# Patient Record
Sex: FEMALE | Race: WHITE | NOT HISPANIC OR LATINO | Employment: UNEMPLOYED | URBAN - METROPOLITAN AREA
[De-identification: names, ages, dates, MRNs, and addresses within clinical notes are randomized per-mention and may not be internally consistent; named-entity substitution may affect disease eponyms.]

---
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
## 2025-01-03 ENCOUNTER — APPOINTMENT (OUTPATIENT)
Dept: CARDIOLOGY | Facility: CLINIC | Age: 20
End: 2025-01-03
Payer: COMMERCIAL

## 2025-01-10 ENCOUNTER — APPOINTMENT (OUTPATIENT)
Dept: OBSTETRICS AND GYNECOLOGY | Facility: CLINIC | Age: 20
End: 2025-01-10
Payer: COMMERCIAL

## 2025-01-21 ENCOUNTER — APPOINTMENT (OUTPATIENT)
Dept: PRIMARY CARE | Facility: CLINIC | Age: 20
End: 2025-01-21
Payer: COMMERCIAL

## 2025-01-21 VITALS
WEIGHT: 112 LBS | HEART RATE: 73 BPM | HEIGHT: 64 IN | RESPIRATION RATE: 16 BRPM | DIASTOLIC BLOOD PRESSURE: 80 MMHG | SYSTOLIC BLOOD PRESSURE: 110 MMHG | OXYGEN SATURATION: 98 % | BODY MASS INDEX: 19.12 KG/M2

## 2025-01-21 DIAGNOSIS — Z11.4 ENCOUNTER FOR SCREENING FOR HIV: ICD-10-CM

## 2025-01-21 DIAGNOSIS — Z11.59 NEED FOR HEPATITIS C SCREENING TEST: ICD-10-CM

## 2025-01-21 DIAGNOSIS — F41.9 ANXIETY: ICD-10-CM

## 2025-01-21 DIAGNOSIS — Z72.0 VAPES NICOTINE CONTAINING SUBSTANCE: ICD-10-CM

## 2025-01-21 DIAGNOSIS — Z11.3 SCREENING FOR GONORRHEA: ICD-10-CM

## 2025-01-21 DIAGNOSIS — F32.2 SEVERE MAJOR DEPRESSION (MULTI): Primary | ICD-10-CM

## 2025-01-21 DIAGNOSIS — Z13.220 LIPID SCREENING: ICD-10-CM

## 2025-01-21 DIAGNOSIS — Z11.8 SCREENING FOR CHLAMYDIAL DISEASE: ICD-10-CM

## 2025-01-21 DIAGNOSIS — Z23 ENCOUNTER FOR IMMUNIZATION: ICD-10-CM

## 2025-01-21 DIAGNOSIS — A60.00 RECURRENT GENITAL HERPES: ICD-10-CM

## 2025-01-21 DIAGNOSIS — G43.109 MIGRAINE WITH AURA AND WITHOUT STATUS MIGRAINOSUS, NOT INTRACTABLE: ICD-10-CM

## 2025-01-21 PROBLEM — R00.2 PALPITATIONS: Status: RESOLVED | Noted: 2024-10-03 | Resolved: 2025-01-21

## 2025-01-21 PROBLEM — R55 SYNCOPAL EPISODES: Status: RESOLVED | Noted: 2024-10-03 | Resolved: 2025-01-21

## 2025-01-21 PROBLEM — R06.02 SOBOE (SHORTNESS OF BREATH ON EXERTION): Status: RESOLVED | Noted: 2024-12-26 | Resolved: 2025-01-21

## 2025-01-21 PROBLEM — R42 DIZZINESS: Status: RESOLVED | Noted: 2024-10-03 | Resolved: 2025-01-21

## 2025-01-21 PROCEDURE — 90471 IMMUNIZATION ADMIN: CPT | Performed by: FAMILY MEDICINE

## 2025-01-21 PROCEDURE — 3008F BODY MASS INDEX DOCD: CPT | Performed by: FAMILY MEDICINE

## 2025-01-21 PROCEDURE — 90677 PCV20 VACCINE IM: CPT | Performed by: FAMILY MEDICINE

## 2025-01-21 PROCEDURE — 1036F TOBACCO NON-USER: CPT | Performed by: FAMILY MEDICINE

## 2025-01-21 PROCEDURE — 99214 OFFICE O/P EST MOD 30 MIN: CPT | Performed by: FAMILY MEDICINE

## 2025-01-21 RX ORDER — TOPIRAMATE 25 MG/1
TABLET ORAL
Qty: 70 TABLET | Refills: 0 | Status: SHIPPED | OUTPATIENT
Start: 2025-01-21

## 2025-01-21 RX ORDER — FLUOXETINE HYDROCHLORIDE 20 MG/1
20 CAPSULE ORAL DAILY
Qty: 30 CAPSULE | Refills: 5 | Status: SHIPPED | OUTPATIENT
Start: 2025-01-21 | End: 2025-07-20

## 2025-01-21 RX ORDER — BACLOFEN 20 MG
TABLET ORAL
COMMUNITY

## 2025-01-21 RX ORDER — SUMATRIPTAN SUCCINATE 50 MG/1
50 TABLET ORAL ONCE AS NEEDED
Qty: 9 TABLET | Refills: 5 | Status: SHIPPED | OUTPATIENT
Start: 2025-01-21 | End: 2026-01-21

## 2025-01-21 ASSESSMENT — ANXIETY QUESTIONNAIRES
5. BEING SO RESTLESS THAT IT IS HARD TO SIT STILL: MORE THAN HALF THE DAYS
IF YOU CHECKED OFF ANY PROBLEMS ON THIS QUESTIONNAIRE, HOW DIFFICULT HAVE THESE PROBLEMS MADE IT FOR YOU TO DO YOUR WORK, TAKE CARE OF THINGS AT HOME, OR GET ALONG WITH OTHER PEOPLE: VERY DIFFICULT
GAD7 TOTAL SCORE: 14
3. WORRYING TOO MUCH ABOUT DIFFERENT THINGS: MORE THAN HALF THE DAYS
7. FEELING AFRAID AS IF SOMETHING AWFUL MIGHT HAPPEN: MORE THAN HALF THE DAYS
2. NOT BEING ABLE TO STOP OR CONTROL WORRYING: MORE THAN HALF THE DAYS
6. BECOMING EASILY ANNOYED OR IRRITABLE: MORE THAN HALF THE DAYS
1. FEELING NERVOUS, ANXIOUS, OR ON EDGE: MORE THAN HALF THE DAYS
4. TROUBLE RELAXING: MORE THAN HALF THE DAYS

## 2025-01-21 ASSESSMENT — PATIENT HEALTH QUESTIONNAIRE - PHQ9
5. POOR APPETITE OR OVEREATING: MORE THAN HALF THE DAYS
8. MOVING OR SPEAKING SO SLOWLY THAT OTHER PEOPLE COULD HAVE NOTICED. OR THE OPPOSITE, BEING SO FIGETY OR RESTLESS THAT YOU HAVE BEEN MOVING AROUND A LOT MORE THAN USUAL: MORE THAN HALF THE DAYS
10. IF YOU CHECKED OFF ANY PROBLEMS, HOW DIFFICULT HAVE THESE PROBLEMS MADE IT FOR YOU TO DO YOUR WORK, TAKE CARE OF THINGS AT HOME, OR GET ALONG WITH OTHER PEOPLE: VERY DIFFICULT
7. TROUBLE CONCENTRATING ON THINGS, SUCH AS READING THE NEWSPAPER OR WATCHING TELEVISION: SEVERAL DAYS
SUM OF ALL RESPONSES TO PHQ9 QUESTIONS 1 & 2: 4
3. TROUBLE FALLING OR STAYING ASLEEP: NEARLY EVERY DAY
SUM OF ALL RESPONSES TO PHQ QUESTIONS 1-9: 18
9. THOUGHTS THAT YOU WOULD BE BETTER OFF DEAD, OR OF HURTING YOURSELF: NOT AT ALL
6. FEELING BAD ABOUT YOURSELF - OR THAT YOU ARE A FAILURE OR HAVE LET YOURSELF OR YOUR FAMILY DOWN: NEARLY EVERY DAY
2. FEELING DOWN, DEPRESSED OR HOPELESS: MORE THAN HALF THE DAYS
1. LITTLE INTEREST OR PLEASURE IN DOING THINGS: MORE THAN HALF THE DAYS
4. FEELING TIRED OR HAVING LITTLE ENERGY: NEARLY EVERY DAY

## 2025-01-21 ASSESSMENT — ENCOUNTER SYMPTOMS
DIAPHORESIS: 0
NAUSEA: 0
FREQUENCY: 0
HEMATURIA: 0
PALPITATIONS: 0
COUGH: 0
DYSPHORIC MOOD: 1
CONSTIPATION: 0
ABDOMINAL PAIN: 0
WHEEZING: 0
VOMITING: 0
POLYPHAGIA: 0
POLYDIPSIA: 0
SINUS PRESSURE: 0
NERVOUS/ANXIOUS: 0
DYSURIA: 0
SINUS PAIN: 0
ADENOPATHY: 0
UNEXPECTED WEIGHT CHANGE: 0
SORE THROAT: 0
CONFUSION: 0
NUMBNESS: 0
CHEST TIGHTNESS: 0
CHILLS: 0
HEADACHES: 1
SHORTNESS OF BREATH: 0
DIARRHEA: 0
LIGHT-HEADEDNESS: 0
DIZZINESS: 0
FEVER: 0

## 2025-01-21 NOTE — PATIENT INSTRUCTIONS
Tracee Manley ,    Thank you for coming in today. We at [facility] appreciate your trust in our care. If you have any questions or concerns about the care you received today, please do not hesitate to contact us at [phone].    The following instructions were discussed today:    - INCREASE fluoxetine to 20 mg daily   - start topiramate --> Take 25 mg at bedtime x 1 week, then take 25 mg twice daily x 1 week, then take 25 mg in AM and 50 mg in PM x 1 week, then take 50 mg twice daily  - use imitrex as needed for migraines   - get labs  - For blood work: Nothing to eat or drink for at least 10 hours prior. Okay for water or black coffee.   - Follow up in 1 month to discuss depression and headaches   I recommend the following vaccines at the pharmacy: Flu, COVID-19

## 2025-01-21 NOTE — ASSESSMENT & PLAN NOTE
- still getting daily headaches and migraines about 2-3 times a week   - had side effects with propranolol (fatigue) and it was ineffective  - has not tried imitrex yet  - encouraged her to try imitrex as needed  - trial topiramate --> Take 25 mg at bedtime x 1 week, then take 25 mg twice daily x 1 week, then take 25 mg in AM and 50 mg in PM x 1 week, then take 50 mg twice daily

## 2025-01-21 NOTE — PROGRESS NOTES
"Subjective   Patient ID: Tracee Manley is a 19 y.o. female who presents for follow up (Would like Prevnar vaccine today (vapes)/Wants to be on birth control, would you prefer she ask her ob/gyn?).    HPI   routine follow up. chronic issues as per assessment and plan.     Tolerating fluoxetine without issues. She feels the dose is too low. Denies medication side effects.     She is no longer taking Inderal LA. Does not feel that it helped her headaches and it caused her to be very tired. Has not tried the imitrex.     Patient Health Questionnaire-9 Score: 18 (1/21/2025  9:00 AM)   LANIE-7 Total Score: 14 (1/21/2025  9:00 AM)     Review of Systems   Constitutional:  Negative for chills, diaphoresis, fever and unexpected weight change.   HENT:  Negative for congestion, sinus pressure, sinus pain, sneezing and sore throat.    Respiratory:  Negative for cough, chest tightness, shortness of breath and wheezing.    Cardiovascular:  Negative for chest pain, palpitations and leg swelling.   Gastrointestinal:  Negative for abdominal pain, constipation, diarrhea, nausea and vomiting.   Endocrine: Negative for cold intolerance, heat intolerance, polydipsia, polyphagia and polyuria.   Genitourinary:  Negative for dysuria, frequency, hematuria and urgency.   Neurological:  Positive for headaches. Negative for dizziness, syncope, light-headedness and numbness.   Hematological:  Negative for adenopathy.   Psychiatric/Behavioral:  Positive for dysphoric mood. Negative for confusion. The patient is not nervous/anxious.        Objective   /80 (BP Location: Right arm, Patient Position: Sitting, BP Cuff Size: Adult)   Pulse 73   Resp 16   Ht 1.626 m (5' 4\")   Wt 50.8 kg (112 lb)   SpO2 98%   BMI 19.22 kg/m²     Physical Exam  Vitals and nursing note reviewed.   Constitutional:       General: She is not in acute distress.     Appearance: Normal appearance.   HENT:      Head: Normocephalic and atraumatic.      Nose: Nose " normal.   Eyes:      Extraocular Movements: Extraocular movements intact.      Conjunctiva/sclera: Conjunctivae normal.      Pupils: Pupils are equal, round, and reactive to light.   Cardiovascular:      Rate and Rhythm: Normal rate and regular rhythm.      Heart sounds: No murmur heard.     No friction rub. No gallop.   Pulmonary:      Effort: Pulmonary effort is normal.      Breath sounds: Normal breath sounds. No wheezing, rhonchi or rales.   Abdominal:      General: Bowel sounds are normal. There is no distension.      Palpations: Abdomen is soft.      Tenderness: There is no abdominal tenderness.   Musculoskeletal:         General: Normal range of motion.      Cervical back: Normal range of motion and neck supple.   Skin:     General: Skin is warm and dry.   Neurological:      General: No focal deficit present.      Mental Status: She is alert and oriented to person, place, and time.      Deep Tendon Reflexes: Reflexes normal.   Psychiatric:         Mood and Affect: Mood normal.         Behavior: Behavior normal.         Thought Content: Thought content normal.         Judgment: Judgment normal.         Assessment/Plan   Problem List Items Addressed This Visit             ICD-10-CM    Anxiety F41.9     - suboptimally controlled.   -  INCREASE fluoxetine to 20 mg daily          Relevant Medications    FLUoxetine (PROzac) 20 mg capsule    Other Relevant Orders    CBC and Auto Differential    Comprehensive Metabolic Panel    TSH with reflex to Free T4 if abnormal    Vitamin B12    Encounter for immunization Z23     - PCV 20 administered today   - recommended the following vaccines at the pharmacy: Flu, COVID-19         Relevant Orders    Pneumococcal conjugate vaccine, 20-valent (PREVNAR 20) (Completed)    Migraine with aura and without status migrainosus, not intractable G43.109     - still getting daily headaches and migraines about 2-3 times a week   - had side effects with propranolol (fatigue) and it was  ineffective  - has not tried imitrex yet  - encouraged her to try imitrex as needed  - trial topiramate --> Take 25 mg at bedtime x 1 week, then take 25 mg twice daily x 1 week, then take 25 mg in AM and 50 mg in PM x 1 week, then take 50 mg twice daily         Relevant Medications    SUMAtriptan (Imitrex) 50 mg tablet    topiramate (Topamax) 25 mg tablet    Other Relevant Orders    Comprehensive Metabolic Panel    TSH with reflex to Free T4 if abnormal    Recurrent genital herpes A60.00     - continue valtrex          Screening for chlamydial disease Z11.8    Relevant Orders    C. trachomatis / N. gonorrhoeae, Amplified    Screening for gonorrhea Z11.3    Relevant Orders    C. trachomatis / N. gonorrhoeae, Amplified    Severe major depression (Multi) - Primary F32.2     - suboptimally controlled.   -  INCREASE fluoxetine to 20 mg daily          Relevant Medications    FLUoxetine (PROzac) 20 mg capsule    Other Relevant Orders    CBC and Auto Differential    Comprehensive Metabolic Panel    TSH with reflex to Free T4 if abnormal    Vitamin B12    Vapes nicotine containing substance Z72.0     - encouraged cessation          Other Visit Diagnoses         Codes    Lipid screening     Z13.220    Relevant Orders    Lipid Panel    Encounter for screening for HIV     Z11.4    Relevant Orders    HIV 1/2 Antigen/Antibody Screen with Reflex to Confirmation    Need for hepatitis C screening test     Z11.59    Relevant Orders    Hepatitis C Antibody

## 2025-02-04 ENCOUNTER — APPOINTMENT (OUTPATIENT)
Dept: PRIMARY CARE | Facility: CLINIC | Age: 20
End: 2025-02-04
Payer: COMMERCIAL

## 2025-02-12 ENCOUNTER — PATIENT MESSAGE (OUTPATIENT)
Dept: PRIMARY CARE | Facility: CLINIC | Age: 20
End: 2025-02-12
Payer: COMMERCIAL

## 2025-02-12 LAB
ALBUMIN SERPL-MCNC: 4.5 G/DL (ref 3.6–5.1)
ALP SERPL-CCNC: 88 U/L (ref 36–128)
ALT SERPL-CCNC: 10 U/L (ref 5–32)
ANION GAP SERPL CALCULATED.4IONS-SCNC: 7 MMOL/L (CALC) (ref 7–17)
AST SERPL-CCNC: 13 U/L (ref 12–32)
BASOPHILS # BLD AUTO: 28 CELLS/UL (ref 0–200)
BASOPHILS NFR BLD AUTO: 0.5 %
BILIRUB SERPL-MCNC: 0.5 MG/DL (ref 0.2–1.1)
BUN SERPL-MCNC: 7 MG/DL (ref 7–20)
C TRACH RRNA SPEC QL NAA+PROBE: NOT DETECTED
CALCIUM SERPL-MCNC: 9.3 MG/DL (ref 8.9–10.4)
CHLORIDE SERPL-SCNC: 106 MMOL/L (ref 98–110)
CHOLEST SERPL-MCNC: 137 MG/DL
CHOLEST/HDLC SERPL: 2 (CALC)
CO2 SERPL-SCNC: 24 MMOL/L (ref 20–32)
CREAT SERPL-MCNC: 0.53 MG/DL (ref 0.5–0.96)
EGFRCR SERPLBLD CKD-EPI 2021: 137 ML/MIN/1.73M2
EOSINOPHIL # BLD AUTO: 101 CELLS/UL (ref 15–500)
EOSINOPHIL NFR BLD AUTO: 1.8 %
ERYTHROCYTE [DISTWIDTH] IN BLOOD BY AUTOMATED COUNT: 13.1 % (ref 11–15)
GLUCOSE SERPL-MCNC: 77 MG/DL (ref 65–99)
HCT VFR BLD AUTO: 38.6 % (ref 35–45)
HCV AB SERPL QL IA: NORMAL
HDLC SERPL-MCNC: 69 MG/DL
HGB BLD-MCNC: 12.5 G/DL (ref 11.7–15.5)
HIV 1+2 AB+HIV1 P24 AG SERPL QL IA: NORMAL
LDLC SERPL CALC-MCNC: 57 MG/DL (CALC)
LYMPHOCYTES # BLD AUTO: 1137 CELLS/UL (ref 850–3900)
LYMPHOCYTES NFR BLD AUTO: 20.3 %
MCH RBC QN AUTO: 29.9 PG (ref 27–33)
MCHC RBC AUTO-ENTMCNC: 32.4 G/DL (ref 32–36)
MCV RBC AUTO: 92.3 FL (ref 80–100)
MONOCYTES # BLD AUTO: 403 CELLS/UL (ref 200–950)
MONOCYTES NFR BLD AUTO: 7.2 %
N GONORRHOEA RRNA SPEC QL NAA+PROBE: NOT DETECTED
NEUTROPHILS # BLD AUTO: 3931 CELLS/UL (ref 1500–7800)
NEUTROPHILS NFR BLD AUTO: 70.2 %
NONHDLC SERPL-MCNC: 68 MG/DL (CALC)
PLATELET # BLD AUTO: 165 THOUSAND/UL (ref 140–400)
PMV BLD REES-ECKER: 13.3 FL (ref 7.5–12.5)
POTASSIUM SERPL-SCNC: 3.8 MMOL/L (ref 3.8–5.1)
PROT SERPL-MCNC: 6.8 G/DL (ref 6.3–8.2)
QUEST GC CT AMPLIFIED (ALWAYS MESSAGE): NORMAL
RBC # BLD AUTO: 4.18 MILLION/UL (ref 3.8–5.1)
SODIUM SERPL-SCNC: 137 MMOL/L (ref 135–146)
TRIGL SERPL-MCNC: 37 MG/DL
TSH SERPL-ACNC: 2.68 MIU/L
VIT B12 SERPL-MCNC: 561 PG/ML (ref 200–1100)
WBC # BLD AUTO: 5.6 THOUSAND/UL (ref 3.8–10.8)

## 2025-02-21 ENCOUNTER — APPOINTMENT (OUTPATIENT)
Dept: PRIMARY CARE | Facility: CLINIC | Age: 20
End: 2025-02-21
Payer: COMMERCIAL

## 2025-02-21 ENCOUNTER — TELEPHONE (OUTPATIENT)
Dept: PRIMARY CARE | Facility: CLINIC | Age: 20
End: 2025-02-21

## 2025-02-21 DIAGNOSIS — Z34.90 PREGNANCY, UNSPECIFIED GESTATIONAL AGE (HHS-HCC): ICD-10-CM

## 2025-02-21 DIAGNOSIS — F41.9 ANXIETY: ICD-10-CM

## 2025-02-21 DIAGNOSIS — A60.00 RECURRENT GENITAL HERPES: ICD-10-CM

## 2025-02-21 DIAGNOSIS — F32.2 SEVERE MAJOR DEPRESSION (MULTI): Primary | ICD-10-CM

## 2025-02-21 DIAGNOSIS — G43.109 MIGRAINE WITH AURA AND WITHOUT STATUS MIGRAINOSUS, NOT INTRACTABLE: ICD-10-CM

## 2025-02-21 DIAGNOSIS — F17.210 CIGARETTE NICOTINE DEPENDENCE WITHOUT COMPLICATION: ICD-10-CM

## 2025-02-21 PROBLEM — N91.2 AMENORRHEA: Status: RESOLVED | Noted: 2024-10-03 | Resolved: 2025-02-21

## 2025-02-21 PROCEDURE — 1036F TOBACCO NON-USER: CPT | Performed by: FAMILY MEDICINE

## 2025-02-21 PROCEDURE — 99213 OFFICE O/P EST LOW 20 MIN: CPT | Performed by: FAMILY MEDICINE

## 2025-02-21 RX ORDER — NICOTINE 4 MG/1
1 INHALANT RESPIRATORY (INHALATION) AS NEEDED
Qty: 42 EACH | Refills: 0 | Status: SHIPPED | OUTPATIENT
Start: 2025-02-21 | End: 2025-03-23

## 2025-02-21 RX ORDER — SERTRALINE HYDROCHLORIDE 50 MG/1
50 TABLET, FILM COATED ORAL DAILY
Qty: 30 TABLET | Refills: 1 | Status: SHIPPED | OUTPATIENT
Start: 2025-02-21 | End: 2025-04-22

## 2025-02-21 ASSESSMENT — ENCOUNTER SYMPTOMS
CHEST TIGHTNESS: 0
UNEXPECTED WEIGHT CHANGE: 0
DIARRHEA: 0
WHEEZING: 0
CONFUSION: 0
SORE THROAT: 0
PALPITATIONS: 0
DIZZINESS: 0
NERVOUS/ANXIOUS: 0
CONSTIPATION: 0
COUGH: 0
DYSURIA: 0
FREQUENCY: 0
SHORTNESS OF BREATH: 0
LIGHT-HEADEDNESS: 0
NUMBNESS: 0
POLYDIPSIA: 0
VOMITING: 0
DIAPHORESIS: 0
NAUSEA: 0
HEADACHES: 0
CHILLS: 0
FEVER: 0
HEMATURIA: 0
ABDOMINAL PAIN: 0
ADENOPATHY: 0
DYSPHORIC MOOD: 0
POLYPHAGIA: 0
SINUS PRESSURE: 0
SINUS PAIN: 0

## 2025-02-21 NOTE — ASSESSMENT & PLAN NOTE
- no relief with topomax  - sumatriptan contraindicated in pregnancy  - discussed treatment options. She is going to work with her chiropractor to see if manipulation will help with the migraines

## 2025-02-21 NOTE — ASSESSMENT & PLAN NOTE
- she has been on fluoxetine but recently found out she is pregnant  - switch fluoxetine to sertraline

## 2025-02-21 NOTE — PATIENT INSTRUCTIONS
Tracee Manley ,    Thank you for coming in today. We at [facility] appreciate your trust in our care. If you have any questions or concerns about the care you received today, please do not hesitate to contact us at [phone].    The following instructions were discussed today:    - START sertraline 25 mg daily for one week and then INCREASE to 50 mg daily   - Follow up 7/21/2025 as scheduled.

## 2025-02-21 NOTE — PROGRESS NOTES
Subjective   Patient ID: Tracee Manley is a 19 y.o. female who presents for Follow-up.    Virtual or Telephone Consent    An interactive audio and video telecommunication system which permits real time communications between the patient (at the originating site) and provider (at the distant site) was utilized to provide this telehealth service.   Verbal consent was requested and obtained from Tracee Manley on this date, 02/21/25 for a telehealth visit.      HPI  Follow up depression/ anxiety. Increased fluoxetine at last visit.     Since her last visit, she has found out that she is pregnant. She does currently smoke and would like help with stopping.     Review of Systems   Constitutional:  Negative for chills, diaphoresis, fever and unexpected weight change.   HENT:  Negative for congestion, sinus pressure, sinus pain, sneezing and sore throat.    Respiratory:  Negative for cough, chest tightness, shortness of breath and wheezing.    Cardiovascular:  Negative for chest pain, palpitations and leg swelling.   Gastrointestinal:  Negative for abdominal pain, constipation, diarrhea, nausea and vomiting.   Endocrine: Negative for cold intolerance, heat intolerance, polydipsia, polyphagia and polyuria.   Genitourinary:  Negative for dysuria, frequency, hematuria and urgency.   Neurological:  Negative for dizziness, syncope, light-headedness, numbness and headaches.   Hematological:  Negative for adenopathy.   Psychiatric/Behavioral:  Negative for confusion and dysphoric mood. The patient is not nervous/anxious.          Assessment/Plan   Problem List Items Addressed This Visit       Anxiety     - she has been on fluoxetine but recently found out she is pregnant  - switch fluoxetine to sertraline          Relevant Medications    sertraline (Zoloft) 50 mg tablet    Cigarette nicotine dependence without complication     - discussed risk of smoking during pregnancy. She is trying to stop but having a difficult time.  Will trial nicotrol inhaler to see if that helps.          Relevant Medications    nicotine (Nicotrol) 10 mg inhaler    Migraine with aura and without status migrainosus, not intractable     - no relief with topomax  - sumatriptan contraindicated in pregnancy  - discussed treatment options. She is going to work with her chiropractor to see if manipulation will help with the migraines          Pregnancy ([facility])     - will be seeing OB/ GYN         Recurrent genital herpes     - continue valtrex          Severe major depression (Multi) - Primary     - she has been on fluoxetine but recently found out she is pregnant  - switch fluoxetine to sertraline          Relevant Medications    sertraline (Zoloft) 50 mg tablet

## 2025-02-21 NOTE — ASSESSMENT & PLAN NOTE
- discussed risk of smoking during pregnancy. She is trying to stop but having a difficult time. Will trial nicotrol inhaler to see if that helps.

## 2025-02-24 ENCOUNTER — APPOINTMENT (OUTPATIENT)
Dept: OBSTETRICS AND GYNECOLOGY | Facility: CLINIC | Age: 20
End: 2025-02-24
Payer: COMMERCIAL

## 2025-02-24 NOTE — TELEPHONE ENCOUNTER
Patient is scheduled for March 21st, left detailed message for patient on her voicemail. Patient advised this will be a virtual visit via [employer].

## 2025-02-25 ENCOUNTER — TELEPHONE (OUTPATIENT)
Dept: PRIMARY CARE | Facility: CLINIC | Age: 20
End: 2025-02-25
Payer: COMMERCIAL

## 2025-02-25 NOTE — TELEPHONE ENCOUNTER
please let patient know  nicotrol inhaler has been discontinued. Would she like to try the nicotine patch instead?

## 2025-03-21 ENCOUNTER — APPOINTMENT (OUTPATIENT)
Dept: PRIMARY CARE | Facility: CLINIC | Age: 20
End: 2025-03-21
Payer: COMMERCIAL

## 2025-04-30 ENCOUNTER — PATIENT MESSAGE (OUTPATIENT)
Dept: PRIMARY CARE | Facility: CLINIC | Age: 20
End: 2025-04-30
Payer: COMMERCIAL

## 2025-06-29 ENCOUNTER — PATIENT MESSAGE (OUTPATIENT)
Dept: PRIMARY CARE | Facility: CLINIC | Age: 20
End: 2025-06-29
Payer: COMMERCIAL

## 2025-06-29 DIAGNOSIS — Z72.0 VAPES NICOTINE CONTAINING SUBSTANCE: Primary | ICD-10-CM

## 2025-06-30 ENCOUNTER — TELEPHONE (OUTPATIENT)
Dept: PRIMARY CARE | Facility: CLINIC | Age: 20
End: 2025-06-30
Payer: COMMERCIAL

## 2025-06-30 DIAGNOSIS — F32.2 SEVERE MAJOR DEPRESSION (MULTI): ICD-10-CM

## 2025-06-30 DIAGNOSIS — F41.9 ANXIETY: ICD-10-CM

## 2025-06-30 RX ORDER — SERTRALINE HYDROCHLORIDE 50 MG/1
50 TABLET, FILM COATED ORAL DAILY
Qty: 30 TABLET | Refills: 1 | Status: SHIPPED | OUTPATIENT
Start: 2025-06-30 | End: 2025-08-29

## 2025-07-02 RX ORDER — IBUPROFEN 200 MG
1 TABLET ORAL EVERY 24 HOURS
Qty: 42 PATCH | Refills: 0 | Status: SHIPPED | OUTPATIENT
Start: 2025-07-02 | End: 2025-08-13

## 2025-07-02 RX ORDER — IBUPROFEN 200 MG
1 TABLET ORAL EVERY 24 HOURS
Qty: 14 PATCH | Refills: 0 | Status: SHIPPED | OUTPATIENT
Start: 2025-08-13 | End: 2025-08-27

## 2025-07-02 RX ORDER — NICOTINE 7MG/24HR
1 PATCH, TRANSDERMAL 24 HOURS TRANSDERMAL EVERY 24 HOURS
Qty: 14 PATCH | Refills: 0 | Status: SHIPPED | OUTPATIENT
Start: 2025-08-27 | End: 2025-09-10

## 2025-07-21 ENCOUNTER — APPOINTMENT (OUTPATIENT)
Dept: PRIMARY CARE | Facility: CLINIC | Age: 20
End: 2025-07-21
Payer: COMMERCIAL

## 2025-07-21 VITALS
SYSTOLIC BLOOD PRESSURE: 104 MMHG | RESPIRATION RATE: 16 BRPM | BODY MASS INDEX: 22.36 KG/M2 | WEIGHT: 131 LBS | HEART RATE: 73 BPM | HEIGHT: 64 IN | OXYGEN SATURATION: 98 % | DIASTOLIC BLOOD PRESSURE: 63 MMHG

## 2025-07-21 DIAGNOSIS — D22.5: ICD-10-CM

## 2025-07-21 DIAGNOSIS — Z72.0 VAPES NICOTINE CONTAINING SUBSTANCE: ICD-10-CM

## 2025-07-21 DIAGNOSIS — A60.00 RECURRENT GENITAL HERPES: ICD-10-CM

## 2025-07-21 DIAGNOSIS — F41.9 ANXIETY: ICD-10-CM

## 2025-07-21 DIAGNOSIS — Z13.220 LIPID SCREENING: ICD-10-CM

## 2025-07-21 DIAGNOSIS — Z34.90 PREGNANCY, UNSPECIFIED GESTATIONAL AGE (HHS-HCC): ICD-10-CM

## 2025-07-21 DIAGNOSIS — F32.2 SEVERE MAJOR DEPRESSION (MULTI): Primary | ICD-10-CM

## 2025-07-21 PROBLEM — F17.211 CIGARETTE NICOTINE DEPENDENCE IN REMISSION: Status: ACTIVE | Noted: 2025-02-21

## 2025-07-21 PROCEDURE — 3008F BODY MASS INDEX DOCD: CPT | Performed by: FAMILY MEDICINE

## 2025-07-21 PROCEDURE — 99213 OFFICE O/P EST LOW 20 MIN: CPT | Performed by: FAMILY MEDICINE

## 2025-07-21 RX ORDER — NICOTINE 7MG/24HR
1 PATCH, TRANSDERMAL 24 HOURS TRANSDERMAL EVERY 24 HOURS
Qty: 14 PATCH | Refills: 0 | Status: SHIPPED | OUTPATIENT
Start: 2025-08-27 | End: 2025-09-10

## 2025-07-21 RX ORDER — FERROUS SULFATE 325(65) MG
1 TABLET ORAL EVERY OTHER DAY
COMMUNITY

## 2025-07-21 RX ORDER — DOCUSATE SODIUM 100 MG/1
100 CAPSULE, LIQUID FILLED ORAL 2 TIMES DAILY
COMMUNITY
Start: 2025-04-01

## 2025-07-21 RX ORDER — VALACYCLOVIR HYDROCHLORIDE 500 MG/1
500 TABLET, FILM COATED ORAL DAILY
Qty: 90 TABLET | Refills: 1 | Status: SHIPPED | OUTPATIENT
Start: 2025-07-21

## 2025-07-21 RX ORDER — IBUPROFEN 200 MG
1 TABLET ORAL EVERY 24 HOURS
Qty: 14 PATCH | Refills: 0 | Status: SHIPPED | OUTPATIENT
Start: 2025-08-13 | End: 2025-08-27

## 2025-07-21 RX ORDER — IBUPROFEN 200 MG
1 TABLET ORAL EVERY 24 HOURS
Qty: 42 PATCH | Refills: 0 | Status: SHIPPED | OUTPATIENT
Start: 2025-07-21 | End: 2025-09-01

## 2025-07-21 RX ORDER — SERTRALINE HYDROCHLORIDE 50 MG/1
50 TABLET, FILM COATED ORAL DAILY
Qty: 90 TABLET | Refills: 1 | Status: SHIPPED | OUTPATIENT
Start: 2025-07-21 | End: 2026-01-17

## 2025-07-21 ASSESSMENT — ENCOUNTER SYMPTOMS
FREQUENCY: 0
HEMATURIA: 0
SHORTNESS OF BREATH: 0
ABDOMINAL PAIN: 0
DIZZINESS: 0
NAUSEA: 0
UNEXPECTED WEIGHT CHANGE: 0
POLYPHAGIA: 0
COUGH: 0
DYSPHORIC MOOD: 0
DYSURIA: 0
VOMITING: 0
FEVER: 0
CHEST TIGHTNESS: 0
CONSTIPATION: 0
PALPITATIONS: 0
NUMBNESS: 0
DIARRHEA: 0
HEADACHES: 0
CHILLS: 0
SORE THROAT: 0
WHEEZING: 0
NERVOUS/ANXIOUS: 0
ADENOPATHY: 0
SINUS PAIN: 0
DIAPHORESIS: 0
LIGHT-HEADEDNESS: 0
POLYDIPSIA: 0
CONFUSION: 0
SINUS PRESSURE: 0

## 2025-07-21 ASSESSMENT — ANXIETY QUESTIONNAIRES
2. NOT BEING ABLE TO STOP OR CONTROL WORRYING: NOT AT ALL
3. WORRYING TOO MUCH ABOUT DIFFERENT THINGS: SEVERAL DAYS
7. FEELING AFRAID AS IF SOMETHING AWFUL MIGHT HAPPEN: NOT AT ALL
GAD7 TOTAL SCORE: 4
IF YOU CHECKED OFF ANY PROBLEMS ON THIS QUESTIONNAIRE, HOW DIFFICULT HAVE THESE PROBLEMS MADE IT FOR YOU TO DO YOUR WORK, TAKE CARE OF THINGS AT HOME, OR GET ALONG WITH OTHER PEOPLE: NOT DIFFICULT AT ALL
5. BEING SO RESTLESS THAT IT IS HARD TO SIT STILL: SEVERAL DAYS
4. TROUBLE RELAXING: SEVERAL DAYS
6. BECOMING EASILY ANNOYED OR IRRITABLE: NOT AT ALL
1. FEELING NERVOUS, ANXIOUS, OR ON EDGE: SEVERAL DAYS

## 2025-07-21 ASSESSMENT — PATIENT HEALTH QUESTIONNAIRE - PHQ9
9. THOUGHTS THAT YOU WOULD BE BETTER OFF DEAD, OR OF HURTING YOURSELF: NOT AT ALL
3. TROUBLE FALLING OR STAYING ASLEEP OR SLEEPING TOO MUCH: MORE THAN HALF THE DAYS
1. LITTLE INTEREST OR PLEASURE IN DOING THINGS: NOT AT ALL
6. FEELING BAD ABOUT YOURSELF - OR THAT YOU ARE A FAILURE OR HAVE LET YOURSELF OR YOUR FAMILY DOWN: NOT AT ALL
5. POOR APPETITE OR OVEREATING: NOT AT ALL
8. MOVING OR SPEAKING SO SLOWLY THAT OTHER PEOPLE COULD HAVE NOTICED. OR THE OPPOSITE, BEING SO FIGETY OR RESTLESS THAT YOU HAVE BEEN MOVING AROUND A LOT MORE THAN USUAL: NOT AT ALL
7. TROUBLE CONCENTRATING ON THINGS, SUCH AS READING THE NEWSPAPER OR WATCHING TELEVISION: NOT AT ALL
SUM OF ALL RESPONSES TO PHQ QUESTIONS 1-9: 3
SUM OF ALL RESPONSES TO PHQ9 QUESTIONS 1 AND 2: 0
4. FEELING TIRED OR HAVING LITTLE ENERGY: SEVERAL DAYS
2. FEELING DOWN, DEPRESSED OR HOPELESS: NOT AT ALL

## 2025-07-21 NOTE — PATIENT INSTRUCTIONS
Tracee Manley ,    Thank you for coming in today. We at [facility] appreciate your trust in our care. If you have any questions or concerns about the care you received today, please do not hesitate to contact us at [phone].    The following instructions were discussed today:    Follow up in 6 months   - Please get blood work done 1-2 weeks prior to your next visit. For blood work: Nothing to eat or drink for at least 10 hours prior. Okay for water or black coffee.

## 2025-07-21 NOTE — PROGRESS NOTES
"Subjective   Patient ID: Tracee Manley is a 20 y.o. female who presents for Follow-up (Has a mole she would like looked at on her left side below the axillary area).    [facility]   routine follow up. chronic issues as per assessment and plan.     Review of Systems   Constitutional:  Negative for chills, diaphoresis, fever and unexpected weight change.   HENT:  Negative for congestion, sinus pressure, sinus pain, sneezing and sore throat.    Respiratory:  Negative for cough, chest tightness, shortness of breath and wheezing.    Cardiovascular:  Negative for chest pain, palpitations and leg swelling.   Gastrointestinal:  Negative for abdominal pain, constipation, diarrhea, nausea and vomiting.   Endocrine: Negative for cold intolerance, heat intolerance, polydipsia, polyphagia and polyuria.   Genitourinary:  Negative for dysuria, frequency, hematuria and urgency.   Neurological:  Negative for dizziness, syncope, light-headedness, numbness and headaches.   Hematological:  Negative for adenopathy.   Psychiatric/Behavioral:  Negative for confusion and dysphoric mood. The patient is not nervous/anxious.        Objective   /63 (BP Location: Right arm, Patient Position: Sitting, BP Cuff Size: Adult)   Pulse 73   Resp 16   Ht 1.626 m (5' 4\")   Wt 59.4 kg (131 lb)   SpO2 98%   BMI 22.49 kg/m²     Physical Exam  Vitals and nursing note reviewed.   Constitutional:       General: She is not in acute distress.     Appearance: Normal appearance.   HENT:      Head: Normocephalic and atraumatic.      Nose: Nose normal.     Eyes:      Extraocular Movements: Extraocular movements intact.      Conjunctiva/sclera: Conjunctivae normal.      Pupils: Pupils are equal, round, and reactive to light.       Cardiovascular:      Rate and Rhythm: Normal rate and regular rhythm.      Heart sounds: No murmur heard.     No friction rub. No gallop.   Pulmonary:      Effort: Pulmonary effort is normal.      Breath sounds: Normal breath " sounds. No wheezing, rhonchi or rales.   Abdominal:      General: Bowel sounds are normal. There is no distension.      Palpations: Abdomen is soft.      Tenderness: There is no abdominal tenderness.     Musculoskeletal:         General: Normal range of motion.      Cervical back: Normal range of motion and neck supple.     Skin:     General: Skin is warm and dry.     Neurological:      General: No focal deficit present.      Mental Status: She is alert and oriented to person, place, and time.      Deep Tendon Reflexes: Reflexes normal.     Psychiatric:         Mood and Affect: Mood normal.         Behavior: Behavior normal.         Thought Content: Thought content normal.         Judgment: Judgment normal.         Assessment/Plan   Problem List Items Addressed This Visit           ICD-10-CM    Anxiety F41.9    - controlled. 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 Sharron Juan         Relevant Orders    Vitamin B12    CBC and Auto Differential    Comprehensive Metabolic Panel    TSH with reflex to Free T4 if abnormal    Recurrent genital herpes A60.00    - continue valtrex          Relevant Medications    valACYclovir (Valtrex) 500 mg tablet    Other Relevant Orders    Vitamin B12    CBC and Auto Differential    Comprehensive Metabolic Panel    TSH with reflex to Free T4 if abnormal    Severe major depression (Multi) - Primary F32.2    - controlled. Continue sertraline          Relevant Medications    nicotine (Nicoderm CQ) 7 mg/24 hr patch (Start on 8/27/2025)    nicotine (Nicoderm CQ) 14 mg/24 hr patch (Start on 8/13/2025)     nicotine (Nicoderm CQ) 21 mg/24 hr patch    sertraline (Zoloft) 50 mg tablet    Other Relevant Orders    Vitamin B12    CBC and Auto Differential    Comprehensive Metabolic Panel    TSH with reflex to Free T4 if abnormal    Vapes nicotine containing substance Z72.0    - encouraged cessation  - trial nicotine patches          Relevant Medications    nicotine (Nicoderm CQ) 7 mg/24 hr patch (Start on 8/27/2025)    nicotine (Nicoderm CQ) 14 mg/24 hr patch (Start on 8/13/2025)    nicotine (Nicoderm CQ) 21 mg/24 hr patch    Other Relevant Orders    Vitamin B12    CBC and Auto Differential    Comprehensive Metabolic Panel    TSH with reflex to Free T4 if abnormal     Other Visit Diagnoses         Codes      Lipid screening     Z13.220    Relevant Orders    Lipid Panel

## 2026-07-23 ENCOUNTER — APPOINTMENT (OUTPATIENT)
Dept: PRIMARY CARE | Facility: CLINIC | Age: 21
End: 2026-07-23
Payer: COMMERCIAL